# Patient Record
Sex: FEMALE | HISPANIC OR LATINO
[De-identification: names, ages, dates, MRNs, and addresses within clinical notes are randomized per-mention and may not be internally consistent; named-entity substitution may affect disease eponyms.]

---

## 2020-06-11 ENCOUNTER — APPOINTMENT (OUTPATIENT)
Dept: ANTEPARTUM | Facility: CLINIC | Age: 30
End: 2020-06-11

## 2020-06-11 PROBLEM — Z00.00 ENCOUNTER FOR PREVENTIVE HEALTH EXAMINATION: Status: ACTIVE | Noted: 2020-06-11

## 2020-07-27 ENCOUNTER — ASOB RESULT (OUTPATIENT)
Age: 30
End: 2020-07-27

## 2020-07-27 ENCOUNTER — APPOINTMENT (OUTPATIENT)
Dept: ANTEPARTUM | Facility: CLINIC | Age: 30
End: 2020-07-27
Payer: MEDICAID

## 2020-07-27 PROCEDURE — 76805 OB US >/= 14 WKS SNGL FETUS: CPT

## 2020-10-08 ENCOUNTER — APPOINTMENT (OUTPATIENT)
Dept: MATERNAL FETAL MEDICINE | Facility: CLINIC | Age: 30
End: 2020-10-08
Payer: MEDICAID

## 2020-10-08 ENCOUNTER — ASOB RESULT (OUTPATIENT)
Age: 30
End: 2020-10-08

## 2020-10-08 VITALS — BODY MASS INDEX: 29.19 KG/M2 | WEIGHT: 171 LBS | HEIGHT: 64 IN

## 2020-10-08 DIAGNOSIS — Z83.3 FAMILY HISTORY OF DIABETES MELLITUS: ICD-10-CM

## 2020-10-08 PROCEDURE — G0108 DIAB MANAGE TRN  PER INDIV: CPT | Mod: 95

## 2020-10-08 RX ORDER — BLOOD SUGAR DIAGNOSTIC
STRIP MISCELLANEOUS
Qty: 1 | Refills: 3 | Status: ACTIVE | COMMUNITY
Start: 2020-10-08 | End: 1900-01-01

## 2020-10-08 RX ORDER — LANCETS
EACH MISCELLANEOUS
Qty: 1 | Refills: 3 | Status: ACTIVE | COMMUNITY
Start: 2020-10-08 | End: 1900-01-01

## 2020-10-08 RX ORDER — BLOOD-GLUCOSE METER
W/DEVICE KIT MISCELLANEOUS
Qty: 1 | Refills: 0 | Status: ACTIVE | COMMUNITY
Start: 2020-10-08 | End: 1900-01-01

## 2020-10-15 ENCOUNTER — APPOINTMENT (OUTPATIENT)
Dept: ANTEPARTUM | Facility: CLINIC | Age: 30
End: 2020-10-15

## 2020-10-15 ENCOUNTER — APPOINTMENT (OUTPATIENT)
Dept: MATERNAL FETAL MEDICINE | Facility: CLINIC | Age: 30
End: 2020-10-15
Payer: MEDICAID

## 2020-10-15 VITALS
OXYGEN SATURATION: 98 % | HEART RATE: 87 BPM | DIASTOLIC BLOOD PRESSURE: 68 MMHG | BODY MASS INDEX: 32.66 KG/M2 | RESPIRATION RATE: 16 BRPM | SYSTOLIC BLOOD PRESSURE: 120 MMHG | WEIGHT: 173 LBS | HEIGHT: 61 IN

## 2020-10-15 DIAGNOSIS — Z3A.31 31 WEEKS GESTATION OF PREGNANCY: ICD-10-CM

## 2020-10-15 DIAGNOSIS — Z78.9 OTHER SPECIFIED HEALTH STATUS: ICD-10-CM

## 2020-10-15 PROCEDURE — 99202 OFFICE O/P NEW SF 15 MIN: CPT

## 2020-10-15 RX ORDER — VITAMIN C, CALCIUM, IRON, VITAMIN D3, VITAMIN E, VITAMIN B1, VITAMIN B2, VITAMIN B3, VITAMIN B6, FOLIC ACID, IODINE, ZINC, COPPER, DOCUSATE SODIUM, DOCOSAHEXAENOIC ACID (DHA) 27-1-50 MG
KIT ORAL
Refills: 0 | Status: ACTIVE | COMMUNITY

## 2020-10-15 NOTE — DISCUSSION/SUMMARY
[FreeTextEntry1] : The patient is a 30-year-old -0-0-1 being seen at 31 weeks estimated gestational age for newly diagnosed gestational diabetes.  Her obstetrical history is significant for delivery in  of a liveborn female  weighing 8 pounds 0 ounces delivered at term via normal spontaneous vaginal delivery.  No problems or complications noted with that pregnancy.  The father of this pregnancy is a different father.  The patient had a 3-hour glucose tolerance test performed with a fasting blood sugar of 94 and 1, 2 and 3 hour blood sugars 197, 213 and 160 which were all elevated.  Dietary consultation was sent under separate cover.\par \par Patient only started testing her blood sugars yesterday.  Her fasting blood sugar was elevated at 107 and her 2 postprandial blood sugars were normal at 119 and 137.  A growth scan was performed on  and revealed a single viable intrauterine gestation with the estimated fetal weight of 2 pounds 12 ounces consistent with the 44th percentile.  Vertex presentation with a posterior placenta was noted and the amniotic fluid index was normal at 14.56 cm.  Vital signs today reveal a blood pressure of 120/68, maternal weight is 173 pounds consistent with a BMI of 32.69 kg.\par \par Management of her pregnancy was discussed.  At this time the patient will continue with the current ADA diet and home glucose monitoring.  Medical intervention will not be started today.  Hemoglobin A1c was obtained in our office today.  She will return in 2 weeks for follow-up evaluation and growth scan and if indicated medical intervention will be started.  Problems and complications related to a diabetic pregnancy including fetal macrosomia, operative vaginal delivery and  section, stillbirth and increased risk of  intensive care unit admission were discussed with the patient.  The patient was again  advised of good dietary choices.  She states that she will be compliant with her diet and home glucose monitoring.  A follow-up dietary consultation is also scheduled.  Follow-up testing schedule will be based on the patient's need for medical intervention.  We will await the hemoglobin A1c result.  No other changes in her current medical management are indicated.  All of the above was discussed with the patient all of her questions were answered.\par \par Her mother has diabetes.  She has no pertinent medical or surgical history.  She has no known allergies to medications and denies alcohol, tobacco or drug use.\par \par I spent a total of 30 minutes face-to-face of which greater than 50% was counseling and coordinating care.\par \par Recommendations;\par \par 1.  Continue current ADA diet and home glucose monitoring.\par 2.  Growth scan and maternal-fetal medicine consultation in 2 weeks is recommended.\par 3.  Hemoglobin A1c obtained in our office today.\par 4.  Follow-up dietary consultation is scheduled.

## 2020-10-16 LAB
ESTIMATED AVERAGE GLUCOSE: 111 MG/DL
HBA1C MFR BLD HPLC: 5.5 %

## 2020-10-29 ENCOUNTER — APPOINTMENT (OUTPATIENT)
Dept: ANTEPARTUM | Facility: CLINIC | Age: 30
End: 2020-10-29

## 2020-10-29 ENCOUNTER — APPOINTMENT (OUTPATIENT)
Dept: MATERNAL FETAL MEDICINE | Facility: CLINIC | Age: 30
End: 2020-10-29

## 2020-11-05 ENCOUNTER — APPOINTMENT (OUTPATIENT)
Dept: MATERNAL FETAL MEDICINE | Facility: CLINIC | Age: 30
End: 2020-11-05

## 2020-11-17 ENCOUNTER — APPOINTMENT (OUTPATIENT)
Dept: MATERNAL FETAL MEDICINE | Facility: CLINIC | Age: 30
End: 2020-11-17
Payer: MEDICAID

## 2020-11-17 ENCOUNTER — ASOB RESULT (OUTPATIENT)
Age: 30
End: 2020-11-17

## 2020-11-17 VITALS — HEIGHT: 61 IN | BODY MASS INDEX: 32.47 KG/M2 | WEIGHT: 172 LBS

## 2020-11-17 DIAGNOSIS — O24.410 GESTATIONAL DIABETES MELLITUS IN PREGNANCY, DIET CONTROLLED: ICD-10-CM

## 2020-11-17 DIAGNOSIS — O24.415 GESTATIONAL DIABETES MELLITUS IN PREGNANCY, CONTROLLED BY ORAL HYPOGLYCEMIC DRUGS: ICD-10-CM

## 2020-11-17 PROCEDURE — G0108 DIAB MANAGE TRN  PER INDIV: CPT | Mod: 95

## 2020-11-17 RX ORDER — METFORMIN HYDROCHLORIDE 500 MG/1
500 TABLET, COATED ORAL
Qty: 1 | Refills: 1 | Status: ACTIVE | COMMUNITY
Start: 2020-11-17 | End: 1900-01-01

## 2020-11-25 ENCOUNTER — APPOINTMENT (OUTPATIENT)
Dept: MATERNAL FETAL MEDICINE | Facility: CLINIC | Age: 30
End: 2020-11-25

## 2020-12-08 ENCOUNTER — APPOINTMENT (OUTPATIENT)
Dept: MATERNAL FETAL MEDICINE | Facility: CLINIC | Age: 30
End: 2020-12-08